# Patient Record
Sex: FEMALE | Race: WHITE | NOT HISPANIC OR LATINO | Employment: PART TIME | URBAN - METROPOLITAN AREA
[De-identification: names, ages, dates, MRNs, and addresses within clinical notes are randomized per-mention and may not be internally consistent; named-entity substitution may affect disease eponyms.]

---
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
## 2023-05-28 ENCOUNTER — HOSPITAL ENCOUNTER (EMERGENCY)
Facility: CLINIC | Age: 23
Discharge: HOME | End: 2023-05-28
Attending: EMERGENCY MEDICINE | Admitting: EMERGENCY MEDICINE
Payer: COMMERCIAL

## 2023-05-28 VITALS
HEART RATE: 79 BPM | SYSTOLIC BLOOD PRESSURE: 99 MMHG | OXYGEN SATURATION: 98 % | DIASTOLIC BLOOD PRESSURE: 65 MMHG | HEIGHT: 60 IN | BODY MASS INDEX: 31.41 KG/M2 | TEMPERATURE: 98.7 F | RESPIRATION RATE: 18 BRPM | WEIGHT: 160 LBS

## 2023-05-28 DIAGNOSIS — L25.3 CONTACT DERMATITIS DUE TO OTHER CHEMICAL PRODUCT, UNSPECIFIED CONTACT DERMATITIS TYPE: ICD-10-CM

## 2023-05-28 PROCEDURE — 250N000012 HC RX MED GY IP 250 OP 636 PS 637: Performed by: EMERGENCY MEDICINE

## 2023-05-28 PROCEDURE — 99284 EMERGENCY DEPT VISIT MOD MDM: CPT

## 2023-05-28 PROCEDURE — 250N000013 HC RX MED GY IP 250 OP 250 PS 637: Performed by: EMERGENCY MEDICINE

## 2023-05-28 RX ORDER — HYDROXYZINE PAMOATE 50 MG/1
50 CAPSULE ORAL 3 TIMES DAILY PRN
Qty: 20 CAPSULE | Refills: 0 | Status: SHIPPED | OUTPATIENT
Start: 2023-05-28

## 2023-05-28 RX ORDER — PREDNISONE 20 MG/1
TABLET ORAL
Qty: 10 TABLET | Refills: 0 | Status: SHIPPED | OUTPATIENT
Start: 2023-05-28

## 2023-05-28 RX ORDER — PREDNISONE 20 MG/1
40 TABLET ORAL ONCE
Status: COMPLETED | OUTPATIENT
Start: 2023-05-28 | End: 2023-05-28

## 2023-05-28 RX ORDER — HYDROXYZINE HYDROCHLORIDE 25 MG/1
50 TABLET, FILM COATED ORAL ONCE
Status: COMPLETED | OUTPATIENT
Start: 2023-05-28 | End: 2023-05-28

## 2023-05-28 RX ADMIN — HYDROXYZINE HYDROCHLORIDE 50 MG: 25 TABLET ORAL at 23:23

## 2023-05-28 RX ADMIN — PREDNISONE 40 MG: 20 TABLET ORAL at 23:23

## 2023-05-28 ASSESSMENT — ACTIVITIES OF DAILY LIVING (ADL): ADLS_ACUITY_SCORE: 35

## 2023-05-29 NOTE — ED PROVIDER NOTES
EMERGENCY DEPARTMENT ENCOUNTER      NAME: Savanah J Foix  AGE: 22 year old female  YOB: 2000  MRN: [id number]  EVALUATION DATE & TIME: 5/28/2023 10:16 PM    PCP: Yuko Patel    ED PROVIDER: Patricia Martínez M.D.      Chief Complaint   Patient presents with     Hand Problem         FINAL IMPRESSION:  1. Contact dermatitis due to other chemical product, unspecified contact dermatitis type        MEDICAL DECISION MAKING:    Pertinent Labs & Imaging studies reviewed. (See chart for details)  ED Course as of 05/29/23 0142   Sun May 28, 2023   2316 Afebrile.  Vital signs are unremarkable.  Patient is coming in with contact dermatitis to her fingertips.  She works as a  and states that she gets red, irritated, itchy fingertips.  She has been using gloves.  Did go to urgent care, they gave her triamcinolone cream and told her to take Benadryl.  Discussed with her at length this is likely some sort of chemical irritant that will continue to get worse if she continues to expose herself to it.  Her exam here is consistent with contact dermatitis.  We will give her some steroids here, Vistaril for the itching.    Exam for patient here with all of her nails newly done with signs of redness, mild swelling, slight blistering noted on all of her fingertips surrounding her nailbeds.  She does not have any evidence of infection.  No evidence of paronychia or felon.  Seems completely consistent with contact dermatitis.    We will discharge to home with medications.  Return for any new or worsening symptoms.         Medical Decision Making    History:    Supplemental history from: Documented in chart, if applicable    External Record(s) reviewed: Documented in chart, if applicable.    Work Up:    Chart documentation includes differential considered and any EKGs or imaging independently interpreted by provider, where specified.    In additional to work up documented, I considered the following work up:  Documented in chart, if applicable.    External consultation:    Discussion of management with another provider: Documented in chart, if applicable    Complicating factors:    Care impacted by chronic illness: Mental Health    Care affected by social determinants of health: N/A    Disposition considerations: Discharge. I prescribed additional prescription strength medication(s) as charted. See documentation for any additional details.          Critical care: 0 minutes excluding separately billable procedures.  Includes bedside management, time reviewing test results, review of records, discussing the case with staff, documenting the medical record and time spent with family members (or surrogate decision makers) discussing specific treatment issues.          ED COURSE:  10:49 PM I met with the patient, obtained history, performed an initial exam, and discussed options and plan for diagnostics and treatment here in the ED.  10:55 PM I discussed the plan for discharge with the patient, and patient is agreeable. We discussed supportive cares at home and reasons for return to the ER including new or worsening symptoms - all questions and concerns addressed. Patient to be discharged by RN.      The importance of close follow up was discussed. We reviewed warning signs and symptoms, and I instructed Ms. Foix to return to the emergency department immediately if she develops any new or worsening symptoms. I provided additional verbal discharge instructions. Ms. Foix expressed understanding and agreement with this plan of care, her questions were answered, and she was discharged in stable condition.     MEDICATIONS GIVEN IN THE EMERGENCY:  Medications   predniSONE (DELTASONE) tablet 40 mg (40 mg Oral $Given 5/28/23 2323)   hydrOXYzine (ATARAX) tablet 50 mg (50 mg Oral $Given 5/28/23 2323)       NEW PRESCRIPTIONS STARTED AT TODAY'S ER VISIT:  Discharge Medication List as of 5/28/2023 11:20 PM      START taking these  medications    Details   hydrOXYzine (VISTARIL) 50 MG capsule Take 1 capsule (50 mg) by mouth 3 times daily as needed for itching, Disp-20 capsule, R-0, Local Print      predniSONE (DELTASONE) 20 MG tablet Take two tablets (= 40mg) each day for 5 (five) days, Disp-10 tablet, R-0, Local Print                =================================================================    HPI    Patient information was obtained from: Patient     Use of : N/A       Savanah J Foix is a 22 year old female who presents with hand problem. Patient presents with persisting itching and rash in the area around her finger tips in her bilateral hands for the past several weeks. Patient has been seen at Urgent Care various times and was diagnosed with contact dermatitis. She has tried triamcinolone with out relief. Denies being prescribed steroids. Patient took  Benadryl 3 hours ago. Patient works as a  and wears gloves when she works. Notes possible exposures to allergens through her work. Patient denies any additional complaints at this time.     REVIEW OF SYSTEMS   Review of Systems   Skin: Positive for rash.   All other systems reviewed and are negative.        PAST MEDICAL HISTORY:  History reviewed. No pertinent past medical history.    PAST SURGICAL HISTORY:  History reviewed. No pertinent surgical history.    CURRENT MEDICATIONS:    No current facility-administered medications for this encounter.    Current Outpatient Medications:      hydrOXYzine (VISTARIL) 50 MG capsule, Take 1 capsule (50 mg) by mouth 3 times daily as needed for itching, Disp: 20 capsule, Rfl: 0     predniSONE (DELTASONE) 20 MG tablet, Take two tablets (= 40mg) each day for 5 (five) days, Disp: 10 tablet, Rfl: 0    ALLERGIES:  Allergies   Allergen Reactions     Amoxicillin Hives       FAMILY HISTORY:  History reviewed. No pertinent family history.    SOCIAL HISTORY:   Social History     Socioeconomic History     Marital status: Single    Tobacco Use     Smoking status: Never     Smokeless tobacco: Never   Substance and Sexual Activity     Alcohol use: No     Drug use: No       PHYSICAL EXAM:    Vitals: BP 99/65   Pulse 79   Temp 98.7  F (37.1  C) (Oral)   Resp 18   Ht 1.524 m (5')   Wt 72.6 kg (160 lb)   LMP 05/21/2023   SpO2 98%   BMI 31.25 kg/m     General:. Alert and interactive, comfortable appearing.  Eyes: Pupils mid-sized  Extremities: All of her nails newly done with signs of redness, mild swelling, slight blistering noted on all of her fingertips surrounding her nailbeds. She does not have any evidence of infection.  No evidence of paronychia or felon.  Seems completely consistent with contact dermatitis. Normal ROM of all major joints. No lower extremity edema.   Skin: Warm and dry. Normal skin color. Also as above  Neuro: Speech clear.  Moves all extremities appropriately.  Psych: Normal affect/mood, cooperative, memory appropriate.         I, Zack Sharma, am serving as a scribe to document services personally performed by Dr. Patricia Martínez  based on my observation and the provider's statements to me. I, Patricia Martínez MD attest that Zack Sharma is acting in a scribe capacity, has observed my performance of the services and has documented them in accordance with my direction.      Patricia Martínez M.D.  Emergency Medicine  [facility] EMERGENCY ROOM  [address] [facility] [address]  [phone]  Dept: [phone]     Patricia Martínez MD  05/29/23 0142

## 2023-05-29 NOTE — ED TRIAGE NOTES
Patient presents to the ED for evaluation of bilateral hand pain (right worse then left, swelling and itching. Symptoms have been present x a few weeks. Was seen by an urgent care and dx with contact dermatitis and prescribed kenalog. Tried Kenalog and Benadryl tonight, no symptoms improvement      Triage Assessment     Row Name 05/28/23 9005       Triage Assessment (Adult)    Airway WDL WDL       Respiratory WDL    Respiratory WDL WDL       Skin Circulation/Temperature WDL    Skin Circulation/Temperature WDL WDL       Cardiac WDL    Cardiac WDL WDL       Peripheral/Neurovascular WDL    Peripheral Neurovascular WDL WDL       Cognitive/Neuro/Behavioral WDL    Cognitive/Neuro/Behavioral WDL WDL